# Patient Record
Sex: MALE | Race: WHITE | URBAN - METROPOLITAN AREA
[De-identification: names, ages, dates, MRNs, and addresses within clinical notes are randomized per-mention and may not be internally consistent; named-entity substitution may affect disease eponyms.]

---

## 2018-12-29 ENCOUNTER — OFFICE VISIT (OUTPATIENT)
Dept: URGENT CARE | Facility: URGENT CARE | Age: 43
End: 2018-12-29
Payer: COMMERCIAL

## 2018-12-29 VITALS — TEMPERATURE: 97.5 F | WEIGHT: 193 LBS | OXYGEN SATURATION: 97 % | HEART RATE: 81 BPM

## 2018-12-29 DIAGNOSIS — J02.9 SORE THROAT: Primary | ICD-10-CM

## 2018-12-29 LAB
DEPRECATED S PYO AG THROAT QL EIA: NORMAL
SPECIMEN SOURCE: NORMAL

## 2018-12-29 PROCEDURE — 99203 OFFICE O/P NEW LOW 30 MIN: CPT | Performed by: PHYSICIAN ASSISTANT

## 2018-12-29 PROCEDURE — 87880 STREP A ASSAY W/OPTIC: CPT | Performed by: PHYSICIAN ASSISTANT

## 2018-12-29 PROCEDURE — 87081 CULTURE SCREEN ONLY: CPT | Performed by: PHYSICIAN ASSISTANT

## 2018-12-29 RX ORDER — GATIFLOXACIN 5 MG/ML
1 SOLUTION/ DROPS OPHTHALMIC
COMMUNITY
Start: 2018-12-28 | End: 2019-01-04

## 2018-12-29 RX ORDER — ESCITALOPRAM OXALATE 10 MG/1
10 TABLET ORAL
COMMUNITY
Start: 2018-08-03

## 2018-12-29 NOTE — PROGRESS NOTES
SUBJECTIVE:  Howie Peña is a 43 year old male with a chief complaint of sore throat.  Onset of symptoms was 1 day(s) ago.  Son with strep and wants to make sure.  No fevers. HA, GI sx or rashes.  Feels good in generally.  Course of illness: gradual onset and still present.  Severity mild  Current and Associated symptoms: no other sx  Treatment measures tried include Fluids and Rest.  Predisposing factors include exposure to strep.    Generally healthy.  Hx of anxiety.        No past medical history on file.  Current Outpatient Medications   Medication Sig Dispense Refill     escitalopram (LEXAPRO) 10 MG tablet Take 10 mg by mouth       gatifloxacin (ZYMAXID) 0.5 % ophthalmic solution Apply 1 drop to eye       Social History     Tobacco Use     Smoking status: Never Smoker     Smokeless tobacco: Never Used   Substance Use Topics     Alcohol use: Not on file       ROS:  Review of systems negative except as stated above.    OBJECTIVE:   Pulse 81   Temp 97.5  F (36.4  C) (Tympanic)   Wt 87.5 kg (193 lb)   SpO2 97%   GENERAL APPEARANCE: healthy, alert and no distress  EYES: EOMI,  PERRL, conjunctiva clear  HENT: TM's normal bilaterally, nose and mouth without erythema, ulcers or lesions and oral mucous membranes moist, no erythema noted  NECK: supple, non-tender to palpation, no adenopathy noted  RESP: lungs clear to auscultation - no rales, rhonchi or wheezes  CV: regular rates and rhythm, normal S1 S2, no murmur noted  SKIN: no suspicious lesions or rashes    Rapid Strep test is negative; await throat culture results.    ASSESSMENT:   Sore throat    PLAN:   Appears well and no signs of infection.  Culture pending.   Symptomatic treat with gargles, lozenges, and OTC analgesic as needed. Follow-up with primary clinic if not improving.

## 2018-12-30 LAB
BACTERIA SPEC CULT: NORMAL
SPECIMEN SOURCE: NORMAL